# Patient Record
Sex: MALE | Race: WHITE | URBAN - METROPOLITAN AREA
[De-identification: names, ages, dates, MRNs, and addresses within clinical notes are randomized per-mention and may not be internally consistent; named-entity substitution may affect disease eponyms.]

---

## 2020-06-16 ENCOUNTER — APPOINTMENT (RX ONLY)
Dept: URBAN - METROPOLITAN AREA CLINIC 167 | Facility: CLINIC | Age: 40
Setting detail: DERMATOLOGY
End: 2020-06-16

## 2020-06-16 DIAGNOSIS — Z41.9 ENCOUNTER FOR PROCEDURE FOR PURPOSES OTHER THAN REMEDYING HEALTH STATE, UNSPECIFIED: ICD-10-CM

## 2020-06-16 DIAGNOSIS — L91.0 HYPERTROPHIC SCAR: ICD-10-CM

## 2020-06-16 PROCEDURE — ? ADDITIONAL NOTES

## 2020-06-16 PROCEDURE — 11900 INJECT SKIN LESIONS </W 7: CPT

## 2020-06-16 PROCEDURE — ? FULL BODY SKIN EXAM - DECLINED

## 2020-06-16 PROCEDURE — ? VENUS VERSA IPL

## 2020-06-16 PROCEDURE — ? INTRALESIONAL KENALOG

## 2020-06-16 ASSESSMENT — LOCATION ZONE DERM
LOCATION ZONE: TRUNK
LOCATION ZONE: TRUNK

## 2020-06-16 ASSESSMENT — LOCATION DETAILED DESCRIPTION DERM
LOCATION DETAILED: XIPHOID
LOCATION DETAILED: EPIGASTRIC SKIN

## 2020-06-16 ASSESSMENT — LOCATION SIMPLE DESCRIPTION DERM
LOCATION SIMPLE: ABDOMEN
LOCATION SIMPLE: ABDOMEN

## 2020-06-16 NOTE — PROCEDURE: VENUS VERSA IPL
Frequency: 1 Hz
Cooling: 60
Fluence: 15
Price (Use Numbers Only, No Special Characters Or $): 100
Detail Level: Zone
Treatment Number: 1
Treated Area: small area
Anesthesia Volume In Cc: 0
Pulse Mode: double
Consent: Written consent obtained, risks reviewed including but not limited to crusting, scabbing, blistering, scarring, darker or lighter pigmentary change, bruising, and/or incomplete response.
Number Of Passes: 2
Applicator:  [id number]
Skin Type (Optional): II
Fluence Units: J/cm2
Post-Care Instructions: I reviewed with the patient in detail post-care instructions. Patient should stay away from the sun and wear sun protection until treated areas are fully healed.
Pulse Delay (In Milliseconds): 10

## 2020-06-16 NOTE — PROCEDURE: INTRALESIONAL KENALOG
Medical Necessity Clause: This procedure was medically necessary because the lesions that were treated were:
Include Z78.9 (Other Specified Conditions Influencing Health Status) As An Associated Diagnosis?: No
Treatment Number (Optional): 1
Detail Level: Detailed
Kenalog Preparation: Kenalog
Concentration Of Solution Injected (Mg/Ml): 20.0
X Size Of Lesion In Cm (Optional): 0
Total Volume Injected (Ccs- Only Use Numbers And Decimals): 0.2
Administered By (Optional): Dr. Kimbrough
Consent: The risks of atrophy were reviewed with the patient.

## 2020-07-21 ENCOUNTER — APPOINTMENT (RX ONLY)
Dept: URBAN - METROPOLITAN AREA CLINIC 167 | Facility: CLINIC | Age: 40
Setting detail: DERMATOLOGY
End: 2020-07-21

## 2020-07-21 DIAGNOSIS — Z41.9 ENCOUNTER FOR PROCEDURE FOR PURPOSES OTHER THAN REMEDYING HEALTH STATE, UNSPECIFIED: ICD-10-CM

## 2020-07-21 PROCEDURE — ? VENUS VERSA IPL

## 2020-07-21 ASSESSMENT — LOCATION ZONE DERM: LOCATION ZONE: TRUNK

## 2020-07-21 ASSESSMENT — LOCATION SIMPLE DESCRIPTION DERM: LOCATION SIMPLE: ABDOMEN

## 2020-07-21 ASSESSMENT — LOCATION DETAILED DESCRIPTION DERM: LOCATION DETAILED: EPIGASTRIC SKIN

## 2020-07-21 NOTE — PROCEDURE: VENUS VERSA IPL
Detail Level: Zone
Anesthesia Volume In Cc: 0
Pulse Duration (In Milliseconds): 15
Consent: Written consent obtained, risks reviewed including but not limited to crusting, scabbing, blistering, scarring, darker or lighter pigmentary change, bruising, and/or incomplete response.
Fluence Units: J/cm2
Treatment Number: 2
Skin Type (Optional): II
Price (Use Numbers Only, No Special Characters Or $): 100
Pulse Delay (In Milliseconds): 10
Pulse Mode: double
Post-Care Instructions: I reviewed with the patient in detail post-care instructions. Patient should stay away from the sun and wear sun protection until treated areas are fully healed.
Fluence: 16
Applicator:  [id number]
Treated Area: small area
Cooling: 80
Frequency: 1 Hz